# Patient Record
Sex: FEMALE | Race: WHITE
[De-identification: names, ages, dates, MRNs, and addresses within clinical notes are randomized per-mention and may not be internally consistent; named-entity substitution may affect disease eponyms.]

---

## 2020-07-07 ENCOUNTER — HOSPITAL ENCOUNTER (INPATIENT)
Dept: HOSPITAL 75 - LDRP | Age: 25
LOS: 2 days | Discharge: HOME | End: 2020-07-09
Attending: FAMILY MEDICINE | Admitting: FAMILY MEDICINE
Payer: MEDICAID

## 2020-07-07 VITALS — DIASTOLIC BLOOD PRESSURE: 81 MMHG | SYSTOLIC BLOOD PRESSURE: 133 MMHG

## 2020-07-07 VITALS — DIASTOLIC BLOOD PRESSURE: 90 MMHG | SYSTOLIC BLOOD PRESSURE: 152 MMHG

## 2020-07-07 VITALS — DIASTOLIC BLOOD PRESSURE: 95 MMHG | SYSTOLIC BLOOD PRESSURE: 174 MMHG

## 2020-07-07 VITALS — DIASTOLIC BLOOD PRESSURE: 90 MMHG | SYSTOLIC BLOOD PRESSURE: 146 MMHG

## 2020-07-07 VITALS — BODY MASS INDEX: 29.52 KG/M2 | HEIGHT: 60.63 IN | WEIGHT: 154.32 LBS

## 2020-07-07 VITALS — SYSTOLIC BLOOD PRESSURE: 134 MMHG | DIASTOLIC BLOOD PRESSURE: 85 MMHG

## 2020-07-07 VITALS — SYSTOLIC BLOOD PRESSURE: 141 MMHG | DIASTOLIC BLOOD PRESSURE: 88 MMHG

## 2020-07-07 VITALS — SYSTOLIC BLOOD PRESSURE: 125 MMHG | DIASTOLIC BLOOD PRESSURE: 81 MMHG

## 2020-07-07 VITALS — SYSTOLIC BLOOD PRESSURE: 163 MMHG | DIASTOLIC BLOOD PRESSURE: 92 MMHG

## 2020-07-07 VITALS — SYSTOLIC BLOOD PRESSURE: 166 MMHG | DIASTOLIC BLOOD PRESSURE: 100 MMHG

## 2020-07-07 DIAGNOSIS — Z3A.39: ICD-10-CM

## 2020-07-07 LAB
ALBUMIN SERPL-MCNC: 3.4 GM/DL (ref 3.2–4.5)
ALP SERPL-CCNC: 167 U/L (ref 40–136)
ALT SERPL-CCNC: < 6 U/L (ref 0–55)
BASOPHILS # BLD AUTO: 0 10^3/UL (ref 0–0.1)
BASOPHILS NFR BLD AUTO: 0 % (ref 0–10)
BILIRUB SERPL-MCNC: 0.2 MG/DL (ref 0.1–1)
BUN/CREAT SERPL: 18
CALCIUM SERPL-MCNC: 9.3 MG/DL (ref 8.5–10.1)
CHLORIDE SERPL-SCNC: 108 MMOL/L (ref 98–107)
CO2 SERPL-SCNC: 17 MMOL/L (ref 21–32)
CREAT SERPL-MCNC: 0.67 MG/DL (ref 0.6–1.3)
EOSINOPHIL # BLD AUTO: 0 10^3/UL (ref 0–0.3)
EOSINOPHIL NFR BLD AUTO: 1 % (ref 0–10)
ERYTHROCYTE [DISTWIDTH] IN BLOOD BY AUTOMATED COUNT: 15 % (ref 10–14.5)
GFR SERPLBLD BASED ON 1.73 SQ M-ARVRAT: > 60 ML/MIN
GLUCOSE SERPL-MCNC: 73 MG/DL (ref 70–105)
HCT VFR BLD CALC: 36 % (ref 35–52)
HGB BLD-MCNC: 11.9 G/DL (ref 11.5–16)
LYMPHOCYTES # BLD AUTO: 1.5 X 10^3 (ref 1–4)
LYMPHOCYTES NFR BLD AUTO: 17 % (ref 12–44)
MANUAL DIFFERENTIAL PERFORMED BLD QL: NO
MCH RBC QN AUTO: 30 PG (ref 25–34)
MCHC RBC AUTO-ENTMCNC: 33 G/DL (ref 32–36)
MCV RBC AUTO: 91 FL (ref 80–99)
MONOCYTES # BLD AUTO: 0.6 X 10^3 (ref 0–1)
MONOCYTES NFR BLD AUTO: 6 % (ref 0–12)
NEUTROPHILS # BLD AUTO: 6.6 X 10^3 (ref 1.8–7.8)
NEUTROPHILS NFR BLD AUTO: 76 % (ref 42–75)
PLATELET # BLD: 137 10^3/UL (ref 130–400)
PMV BLD AUTO: 12.8 FL (ref 7.4–10.4)
POTASSIUM SERPL-SCNC: 3.8 MMOL/L (ref 3.6–5)
PROT SERPL-MCNC: 6.3 GM/DL (ref 6.4–8.2)
SODIUM SERPL-SCNC: 136 MMOL/L (ref 135–145)
URATE SERPL-MCNC: 5.4 MG/DL (ref 2.6–7.2)
WBC # BLD AUTO: 8.7 10^3/UL (ref 4.3–11)

## 2020-07-07 PROCEDURE — 86901 BLOOD TYPING SEROLOGIC RH(D): CPT

## 2020-07-07 PROCEDURE — 80053 COMPREHEN METABOLIC PANEL: CPT

## 2020-07-07 PROCEDURE — 36415 COLL VENOUS BLD VENIPUNCTURE: CPT

## 2020-07-07 PROCEDURE — 83615 LACTATE (LD) (LDH) ENZYME: CPT

## 2020-07-07 PROCEDURE — 85025 COMPLETE CBC W/AUTO DIFF WBC: CPT

## 2020-07-07 PROCEDURE — 86900 BLOOD TYPING SEROLOGIC ABO: CPT

## 2020-07-07 PROCEDURE — 84156 ASSAY OF PROTEIN URINE: CPT

## 2020-07-07 PROCEDURE — 82570 ASSAY OF URINE CREATININE: CPT

## 2020-07-07 PROCEDURE — 86850 RBC ANTIBODY SCREEN: CPT

## 2020-07-07 PROCEDURE — 84550 ASSAY OF BLOOD/URIC ACID: CPT

## 2020-07-07 RX ADMIN — MISOPROSTOL SCH MCG: 100 TABLET ORAL at 21:18

## 2020-07-07 RX ADMIN — SODIUM CHLORIDE, SODIUM LACTATE, POTASSIUM CHLORIDE, CALCIUM CHLORIDE, AND DEXTROSE MONOHYDRATE SCH MLS/HR: 600; 310; 30; 20; 5 INJECTION, SOLUTION INTRAVENOUS at 19:52

## 2020-07-07 NOTE — HISTORY & PHYSICAL-OB
OB - Chief Complaint & HPI


Date/Time


Date of Admission:


Date of Admission:  2020 at 19:10


Date seen by a Provider:  2020


Time Seen by a Provider:  20:00





Chief Complaint/History


OB-Reason for Admission/Chief:  Induction of Labor


Hx :  1


Hx Para:  0


Expected Date of Delivery:  Jul 10, 2020


Gestational Age in Weeks:  39


Gestational Age in Days:  4


Indication for induction:  maternal discomfort


History of Labs


A+, antibody neg, RI. HIV/HepB/RPR NR. GC neg. Chlamdyia positive at initial, 

treated and MICHAEL neg. 1 hour glucola normal. GBS neg.





Allergies and Home Medications


Allergies


Coded Allergies:  


     No Known Drug Allergies (Unverified , 20)





Patient Home Medication List


Home Medication List Reviewed:  No





OB - History


Hx of Present Pregnancy


Ultrasounds:  Normal mid trimester US


Obstetrical Complications:  None


Medical Complications:  None





Obstetrical History


Hx :  1


Hx Para:  0


Hx # Term Pregnancies:  0


Hx #  Pregnancies:  0


Number of Living Children:  0





Patient Past Medical History





PMHx:


Denies





surgHx:


Denies





Social History/Family History


HIV/AIDS:  No


Recent Infectious Disease Expo:  No


Sexually Transmitted Disease:  Yes (chlamydia)


Alcohol Use:  Denies Use


Smoking Cessation:  Never smoker





Immunizations


Tetanus Booster (TDap):  Less than 5yrs (20)


Rubella:  immune


RPR/VDRL:  Negative


GBS Status:  Negative


HBsAG:  Negative





OB - Admission Exam


Physical Exam


HEENT:  NCAT


Extremities:  Normal


Cervical Dilatation:  2cm


Effacement:  0%


Station:  -3


Membranes:  Intact


Fetal Heart Rate:  130's


Accelerations:  Accelerations Present


Decelerations:  No Decelerations


Short Term Variability:  Present


Long Term Variability:  Average (6-25)


Contractions on Admission:  6-10 Minutes Apart


Intensity:  Mild





Murillo Scoring Tool (Modified)


Dilation (cm):  1-2cm (1)


Effacement (%):  0-30%    (0)


Fetal Descent/Station:  -3        (0)


Cervix Consistency:  Soft      (2)


Cervix Position:  Anterior   (2)


Subtract 1 point for:  Nulliparity (-1)


Murillo Score:  4


Labs





Laboratory Tests








Test


 20


20:04 Range/Units


 


 


White Blood Count


 8.7 


 4.3-11.0


10^3/uL


 


Red Blood Count


 3.91 L


 4.35-5.85


10^6/uL


 


Hemoglobin 11.9  11.5-16.0  G/DL


 


Hematocrit 36  35-52  %


 


Mean Corpuscular Volume 91  80-99  FL


 


Mean Corpuscular Hemoglobin 30  25-34  PG


 


Mean Corpuscular Hemoglobin


Concent 33 


 32-36  G/DL





 


Red Cell Distribution Width 15.0 H 10.0-14.5  %


 


Platelet Count


 137 


 130-400


10^3/uL


 


Mean Platelet Volume 12.8 H 7.4-10.4  FL


 


Neutrophils (%) (Auto) 76 H 42-75  %


 


Lymphocytes (%) (Auto) 17  12-44  %


 


Monocytes (%) (Auto) 6  0-12  %


 


Eosinophils (%) (Auto) 1  0-10  %


 


Basophils (%) (Auto) 0  0-10  %


 


Neutrophils # (Auto) 6.6  1.8-7.8  X 10^3


 


Lymphocytes # (Auto) 1.5  1.0-4.0  X 10^3


 


Monocytes # (Auto) 0.6  0.0-1.0  X 10^3


 


Eosinophils # (Auto)


 0.0 


 0.0-0.3


10^3/uL


 


Basophils # (Auto)


 0.0 


 0.0-0.1


10^3/uL











OB - Assessment/Plan/Diagnosis


Assessment


Assessment:  induction of labor


Admission Dx


39 weeks gestation


Induction of labor planned


GBS negative


Blood type A+


Admission Status:  Inpatient Order (span 2 midnights)


Reason for Inpatient Admission:  


Induction, labor, delivery and postpartum course





Plan


Plan:  Induction


Induction Method:  per Misoprostol Protocol











NEAL FRIEDMAN MD              2020 21:29

## 2020-07-07 NOTE — XMS REPORT
Continuity of Care Document

                             Created on: 2020



Dahiana Goldberg

External Reference #: [id number]

: 1995

Sex: Female



Demographics





                          Address                   [address] [place]  [address]

 

                          Home Phone                [phone] x

 

                          Preferred Language        Unknown

 

                          Marital Status            Unknown

 

                          Pentec[employer]alism Affiliation     Unknown

 

                          Race                      Unknown

 

                          Ethnic Group              Unknown





Author





                          Organization              Unknown

 

                          Address                   Unknown

 

                          Phone                     Unavailable



              



Allergies

      



There is no data.                  



Medications

      



There is no data.                  



Problems

      



There is no data.                  



Procedures

      



There is no data.                  



Results

      



                    Test                Result              Range        

 

                                        TSH - 19 12:37         

 

                    TSH                 0.17 mIU/L           NRG        

 

                                        RUBELLA IMMUNE STATUS - 19 12:37  

       

 

                    RUBELLA ANTIBODY (IGG)           1.23 index           NRG   

     

 

                                        CULTURE, URINE - 19 12:37         

 

                    CULTURE, URINE, ROUTINE           SEE NOTE            NRG   

     

 

                                        SUREPATH PAP RFX HPV mRNA E6/E7 -  12:37         

 

                    CLINICAL INFORMATION:                               NRG     

   

 

                    LMP:                                    NRG        

 

                    PREV. PAP:                               NRG        

 

                    PREV. BX:                               NRG        

 

                    SOURCE:             [employer]              NRG        

 

                    STATEMENT OF ADEQUACY:                               NRG    

    

 

                    INTERPRETATION/RESULT:                               NRG    

    

 

                    CYTOTECHNOLOGIST:                               NRG        

 

                    COMMENT                                 NRG        

 

                                        PENTA SCREEN - 02/10/20 12:22         

 

                    Maternal Weight           125 lbs             NRG        

 

                    Est'd Date of Delivery           07/10/2020            NRG  

      

 

                    JENN Determined by           ULTRASOUND            NRG       

 

 

                    Mother's Ethnic Origin                       NRG   

     

 

                    Number of Fetuses           1                   NRG        

 

                    Insulin Depend Diabetic           NO                  NRG   

     

 

                    Repeat Specimen           NO                  NRG        

 

                    Hx Of Neural Tube Defects           NO                  NRG 

       

 

                    Prev Pregnancy Down Synd           NO                  NRG  

      

 

                    Donor Egg           NO                  NRG        

 

                    Donor Age: Egg Retrieval           NOT GIVEN            NRG 

       

 

                    Cigarette smoker           NOT GIVEN            NRG        

 

                    INTERPRETATION:           SEE NOTE            NRG        

 

                    Risk for ONTD           <1:5000             NRG        

 

                    Age Risk Down Syndrome           1:1120              NRG    

    

 

                    NORM Down Syndrome Risk           <1:5000             <1:270 

       

 

                    NORM Trisomy 18 Risk           <1:5000             <1:100    

    

 

                    Calc'd Gestational Age           18.4                NRG    

    

 

                    AFP, Serum           57.5 ng/mL           NRG        

 

                    AFP MoM             1.12                NRG        

 

                    hCG, Serum           21.6 IU/mL           NRG        

 

                    hCG MoM             0.85                NRG        

 

                    Estriol, Free           1.84 ng/mL           NRG        

 

                    Estriol MoM           1.15                NRG        

 

                    Inhibin A, Dimeric           103 pg/mL           NRG        

 

                    Inhibin A MoM           0.56                NRG        

 

                    h-hCG, Serum           14.1 mcg/L           NRG        

 

                    h-hCG MoM           0.70                NRG        

 

                    Date of Birth           1995            NR        

 

                    Collection Date           02/10/2020            NR        

 

                                        SYPHILIS (RPR W/ REFLEX CONFIRMATION) - 

20 12:43         

 

                          RPR (DX) W/REFL TITER AND CONFIRMATORY TESTING        

   NON-REACTIVE           

                                        NON-REACTIVE        

 

                                        CULTURE, GROUP B STREP (VAGINAL) - 06/15

/20 14:53         

 

                    STREPTOCOCCUS, GROUP B CULTURE           SEE NOTE           

 NRG        

 

                                        Complete blood count (CBC) with automate

d white blood cell (WBC) differential - 

20 20:04         

 

                          Blood leukocytes automated count (number/volume)      

     8.7 10*3/uL          

                                        4.3-11.0        

 

                          Blood erythrocytes automated count (number/volume)    

       3.91 10*6/uL       

                                        4.35-5.85        

 

                    Venous blood hemoglobin measurement (mass/volume)           

11.9 g/dL           

11.5-16.0        

 

                    Blood hematocrit (volume fraction)           36 %           

     35-52        

 

                    Automated erythrocyte mean corpuscular volume           91 [

foz_us]           

80-99        

 

                                        Automated erythrocyte mean corpuscular h

emoglobin (mass per erythrocyte)        

                          30 pg                     25-34        

 

                                        Automated erythrocyte mean corpuscular h

emoglobin concentration measurement 

(mass/volume)             33 g/dL                   32-36        

 

                    Automated erythrocyte distribution width ratio           15.

0 %              10.0-

14.5        

 

                    Automated blood platelet count (count/volume)           137 

10*3/uL           

130-400        

 

                          Automated blood platelet mean volume measurement      

     12.8 [foz_us]        

                                        7.4-10.4        

 

                    Automated blood neutrophils/100 leukocytes           76 %   

             42-75       

 

 

                    Automated blood lymphocytes/100 leukocytes           17 %   

             12-44       

 

 

                    Blood monocytes/100 leukocytes           6 %                

 0-12        

 

                    Automated blood eosinophils/100 leukocytes           1 %    

             0-10        

 

                    Automated blood basophils/100 leukocytes           0 %      

           0-10        

 

                    Blood neutrophils automated count (number/volume)           

6.6 10*3            

1.8-7.8        

 

                    Blood lymphocytes automated count (number/volume)           

1.5 10*3            

1.0-4.0        

 

                    Blood monocytes automated count (number/volume)           0.

6 10*3            

0.0-1.0        

 

                    Automated eosinophil count           0.0 10*3/uL           0

.0-0.3        

 

                    Automated blood basophil count (count/volume)           0.0 

10*3/uL           

0.0-0.1        

 

                                        Blood type T Indirect antibody screen pa

pebbles - 20 20:04         

 

                    WRISTBAND NUMBER           [id number]             NRG        

 

                    ABO+Rh group           AP                  NRG        

 

                    Blood group antibody screen           NEGATIVE            NR

G        



                                



Encounters

      



                ACCT No.           Visit Date/Time           Discharge          

 Status         

             Pt. Type           Provider           Facility           Loc./Unit 

          

Complaint        

 

             [id number]           2020 11:00:00                        [facility]    

       

Outpatient           IDALIA HERZOG, NEAL SANTILLAN [facility]                                             

 

             [id number]           06/15/2020 10:00:00                              

       Document

 Registration                                                                   

 

 

             [id number]           2020 11:00:00                              

       Document

 Registration                                                                   

 

 

             [id number]           02/10/2020 10:20:00                              

       Document

 Registration                                                                   

 

 

             [id number]           2019 09:20:00                              

       Document

 Registration                                                                   

 

 

             [id number]           2020 20:22:00                         

            

Document Registration

## 2020-07-07 NOTE — NUR
BERLIN OSORIO presented to unit via  from ED, accompanied by , with c/o INDUCTION 
39 4/7. BERLIN OSORIO weighed, gowned, voided, and to bed.  EFHM and TOCO applied, VS 
taken.  BERLIN OSORIO oriented to bed controls, call light, TV, heat, and A/C controls.

## 2020-07-08 VITALS — SYSTOLIC BLOOD PRESSURE: 159 MMHG | DIASTOLIC BLOOD PRESSURE: 99 MMHG

## 2020-07-08 VITALS — SYSTOLIC BLOOD PRESSURE: 152 MMHG | DIASTOLIC BLOOD PRESSURE: 76 MMHG

## 2020-07-08 VITALS — SYSTOLIC BLOOD PRESSURE: 140 MMHG | DIASTOLIC BLOOD PRESSURE: 83 MMHG

## 2020-07-08 VITALS — SYSTOLIC BLOOD PRESSURE: 141 MMHG | DIASTOLIC BLOOD PRESSURE: 79 MMHG

## 2020-07-08 VITALS — SYSTOLIC BLOOD PRESSURE: 154 MMHG | DIASTOLIC BLOOD PRESSURE: 80 MMHG

## 2020-07-08 VITALS — SYSTOLIC BLOOD PRESSURE: 160 MMHG | DIASTOLIC BLOOD PRESSURE: 81 MMHG

## 2020-07-08 VITALS — DIASTOLIC BLOOD PRESSURE: 79 MMHG | SYSTOLIC BLOOD PRESSURE: 137 MMHG

## 2020-07-08 VITALS — DIASTOLIC BLOOD PRESSURE: 84 MMHG | SYSTOLIC BLOOD PRESSURE: 146 MMHG

## 2020-07-08 VITALS — SYSTOLIC BLOOD PRESSURE: 163 MMHG | DIASTOLIC BLOOD PRESSURE: 93 MMHG

## 2020-07-08 VITALS — SYSTOLIC BLOOD PRESSURE: 148 MMHG | DIASTOLIC BLOOD PRESSURE: 79 MMHG

## 2020-07-08 VITALS — DIASTOLIC BLOOD PRESSURE: 83 MMHG | SYSTOLIC BLOOD PRESSURE: 140 MMHG

## 2020-07-08 VITALS — DIASTOLIC BLOOD PRESSURE: 84 MMHG | SYSTOLIC BLOOD PRESSURE: 145 MMHG

## 2020-07-08 VITALS — DIASTOLIC BLOOD PRESSURE: 97 MMHG | SYSTOLIC BLOOD PRESSURE: 149 MMHG

## 2020-07-08 VITALS — DIASTOLIC BLOOD PRESSURE: 95 MMHG | SYSTOLIC BLOOD PRESSURE: 177 MMHG

## 2020-07-08 VITALS — SYSTOLIC BLOOD PRESSURE: 128 MMHG | DIASTOLIC BLOOD PRESSURE: 79 MMHG

## 2020-07-08 VITALS — SYSTOLIC BLOOD PRESSURE: 160 MMHG | DIASTOLIC BLOOD PRESSURE: 80 MMHG

## 2020-07-08 VITALS — DIASTOLIC BLOOD PRESSURE: 91 MMHG | SYSTOLIC BLOOD PRESSURE: 170 MMHG

## 2020-07-08 VITALS — DIASTOLIC BLOOD PRESSURE: 81 MMHG | SYSTOLIC BLOOD PRESSURE: 144 MMHG

## 2020-07-08 VITALS — SYSTOLIC BLOOD PRESSURE: 158 MMHG | DIASTOLIC BLOOD PRESSURE: 98 MMHG

## 2020-07-08 VITALS — SYSTOLIC BLOOD PRESSURE: 123 MMHG | DIASTOLIC BLOOD PRESSURE: 77 MMHG

## 2020-07-08 VITALS — DIASTOLIC BLOOD PRESSURE: 92 MMHG | SYSTOLIC BLOOD PRESSURE: 138 MMHG

## 2020-07-08 VITALS — DIASTOLIC BLOOD PRESSURE: 90 MMHG | SYSTOLIC BLOOD PRESSURE: 170 MMHG

## 2020-07-08 VITALS — DIASTOLIC BLOOD PRESSURE: 85 MMHG | SYSTOLIC BLOOD PRESSURE: 151 MMHG

## 2020-07-08 VITALS — SYSTOLIC BLOOD PRESSURE: 143 MMHG | DIASTOLIC BLOOD PRESSURE: 92 MMHG

## 2020-07-08 VITALS — DIASTOLIC BLOOD PRESSURE: 80 MMHG | SYSTOLIC BLOOD PRESSURE: 138 MMHG

## 2020-07-08 VITALS — DIASTOLIC BLOOD PRESSURE: 76 MMHG | SYSTOLIC BLOOD PRESSURE: 156 MMHG

## 2020-07-08 VITALS — DIASTOLIC BLOOD PRESSURE: 89 MMHG | SYSTOLIC BLOOD PRESSURE: 138 MMHG

## 2020-07-08 VITALS — DIASTOLIC BLOOD PRESSURE: 101 MMHG | SYSTOLIC BLOOD PRESSURE: 178 MMHG

## 2020-07-08 VITALS — DIASTOLIC BLOOD PRESSURE: 91 MMHG | SYSTOLIC BLOOD PRESSURE: 156 MMHG

## 2020-07-08 VITALS — DIASTOLIC BLOOD PRESSURE: 80 MMHG | SYSTOLIC BLOOD PRESSURE: 166 MMHG

## 2020-07-08 VITALS — SYSTOLIC BLOOD PRESSURE: 124 MMHG | DIASTOLIC BLOOD PRESSURE: 78 MMHG

## 2020-07-08 LAB
CREAT UR-MCNC: 83 MG/DL (ref 30–125)
PROT UR-MCNC: 23 MG/DL (ref 6–12)

## 2020-07-08 PROCEDURE — 0UQGXZZ REPAIR VAGINA, EXTERNAL APPROACH: ICD-10-PCS | Performed by: FAMILY MEDICINE

## 2020-07-08 PROCEDURE — 0UQMXZZ REPAIR VULVA, EXTERNAL APPROACH: ICD-10-PCS | Performed by: FAMILY MEDICINE

## 2020-07-08 PROCEDURE — 3E033VJ INTRODUCTION OF OTHER HORMONE INTO PERIPHERAL VEIN, PERCUTANEOUS APPROACH: ICD-10-PCS | Performed by: FAMILY MEDICINE

## 2020-07-08 RX ADMIN — SODIUM CHLORIDE, SODIUM LACTATE, POTASSIUM CHLORIDE, CALCIUM CHLORIDE, AND DEXTROSE MONOHYDRATE SCH MLS/HR: 600; 310; 30; 20; 5 INJECTION, SOLUTION INTRAVENOUS at 03:55

## 2020-07-08 RX ADMIN — MISOPROSTOL SCH MCG: 100 TABLET ORAL at 08:47

## 2020-07-08 RX ADMIN — DOCUSATE SODIUM SCH MG: 100 CAPSULE ORAL at 21:59

## 2020-07-08 RX ADMIN — ACETAMINOPHEN SCH MG: 500 TABLET ORAL at 13:54

## 2020-07-08 RX ADMIN — ACETAMINOPHEN SCH MG: 500 TABLET ORAL at 21:59

## 2020-07-08 NOTE — NUR
pt transferred to room 309 via wheelchair.  To bed and oriented to room 309, call light and 
surroundings.

## 2020-07-08 NOTE — NUR
Assisted to bathroom, void and pericare shown and done.  pt ambulating well, back to bed.  
denies needs.

## 2020-07-08 NOTE — OB LABOR & DELIVERY RECORD
Vag Delivery Note


Vag Delivery Note


Date of Delivery: 20 





Preoperative Diagnosis: Dahiana Goldberg  is a (25  /Para  1 / 0,

Gestational Age (wks)39with 5 days, hypertension during labor





Postoperative Diagnosis: Same


Surgeon: NEAL FRIEDMAN 





Anesthesia: none





Delivery Type: Spontaneous vaginal delivery





Findings: 


Viable female infant, apgars [], weight 6#4


Lacerations: right periurethral with right vaginal wall extension, left 

periurethral abrasion


Intact placenta with 3 vessel cord. No nuchal cord, body cord or shoulder 

dystocia


Cytotec 800 mcg placed for postpartum hemorrhage prophylaxis





Estimated Blood Loss: 350 ml





Complications: None





Condition: Stable





Description of Procedure:





The patient is a 25 year old female who presented for elective IOL. She was 

admitted and informed consent was obtained. Her labor course was remarkable for 

hypertension, requiring labetalol x 2, labs negative for preeclampsia. She pro

gressed to complete dilatation and began to push. 





She was then set up for delivery. The infant's head was delivered atraumatically

 in the JACKELINE position. The shoulders and remainder of the infant's body were then

 delivered without difficulty. Upon delivery, the infant was vigorous and placed

 on maternal abdomen. After a delay the cord was doubly clamped and cut and the 

infant remained on maternal abdomen. An intact placenta with 3-vessel cord 

delivered via Nisha and there was found to be minimal bleeding.~ Vigorous 

fundal massage was performed and the fundus was found to be firm. IV oxytocin 

was given. Examination of the vagina and perineum revealed a right periurethral 

laceration with small right vaginal side wall extension repaired in simple 

running fashion with 3-0 rapide suture. Following the repair, sponge, instrument

 and needle counts were correct. Mom and baby were both in stable condition in 

the labor suite.





Vitals - Labs


Vital Signs - I&O





Vital Signs








  Date Time  Temp Pulse Resp B/P (MAP) Pulse Ox O2 Delivery O2 Flow Rate FiO2


 


20 06:45  96 18 145/84 (104)    


 


20 06:30  86 18 141/79 (99)    


 


20 06:15  100 18 138/80 (99)    


 


20 06:00  93 18 151/85 (107)    


 


20 05:45  91 18 140/83 (102)    


 


20 05:30  88 18 140/83 (102)    


 


20 05:00 36.9 73 18 163/93 (116)    


 


20 04:30  73 18 152/76 (101)    


 


20 04:00  85 18 138/92 (107)    


 


20 03:30  77 18 177/95 (122)    


 


20 03:00  92 18 149/97 (114)    


 


20 02:30  82 18 159/99 (119)    


 


20 02:00  81 18 170/91 (117)    


 


20 01:30  82 18 143/92 (109)    


 


20 01:00  86 18 138/89 (105)    


 


20 00:30 36.7 73 18 158/98 (118)    


 


20 23:40  72 18 152/90 (110)    


 


20 23:25  71 18 163/92 (115)    


 


20 23:10  69 18 174/95 (121)    


 


20 22:45  79 18 166/100 (122)    


 


20 22:30  67 18 146/90 (108)    


 


20 22:00  75 18 134/85 (101)    


 


20 21:48 37.0 86 18  96 Room Air  


 


20 21:15  74 18 141/88 (105)    


 


20 20:50  83 18 125/81 (96)    











Labs


Laboratory Tests


20 20:04: 


White Blood Count 8.7, Red Blood Count 3.91L, Hemoglobin 11.9, Hematocrit 36, 

Mean Corpuscular Volume 91, Mean Corpuscular Hemoglobin 30, Mean Corpuscular 

Hemoglobin Concent 33, Red Cell Distribution Width 15.0H, Platelet Count 137, 

Mean Platelet Volume 12.8H, Neutrophils (%) (Auto) 76H, Lymphocytes (%) (Auto) 

17, Monocytes (%) (Auto) 6, Eosinophils (%) (Auto) 1, Basophils (%) (Auto) 0, 

Neutrophils # (Auto) 6.6, Lymphocytes # (Auto) 1.5, Monocytes # (Auto) 0.6, 

Eosinophils # (Auto) 0.0, Basophils # (Auto) 0.0


20 23:20: 


Sodium Level 136, Potassium Level 3.8, Chloride Level 108H, Carbon Dioxide Level

 17L, Anion Gap 11, Blood Urea Nitrogen 12, Creatinine 0.67, Estimat Glomerular 

Filtration Rate > 60, BUN/Creatinine Ratio 18, Glucose Level 73, Uric Acid 5.4, 

Calcium Level 9.3, Corrected Calcium 9.8, Total Bilirubin 0.2, Aspartate Amino 

Transf (AST/SGOT) 15, Alanine Aminotransferase (ALT/SGPT) < 6, Alkaline 

Phosphatase 167H, Lactate Dehydrogenase 181, Total Protein 6.3L, Albumin 3.4


20 07:25: 


Urine Protein 23H, Urine Creatinine 83, Urine Protein/Creatinine Ratio 0.28











NEAL FRIEDMAN MD              2020 09:20

## 2020-07-08 NOTE — NUR
Pitocin started at 999ml/hr as ordered by dr cade

0825 spontaneous vaginal delivery of placenta with cord attached.  fundal massage per dr cade.  

0830 local to perineum per dr cade for repair of right periurethral tear.

0845 ffu/0 with moderate rubra noted.

0900 repair completed per dr cade

0905 plan fo care reviewed with pt and s.o.  pericare and pad under pt.  pt assisted to sf 
position

0915 ffu/0 with lt rubra noted

0930 ffu/0 with lt rubra noted

0945 ffu/0 with moderate rubra noted

## 2020-07-09 VITALS — DIASTOLIC BLOOD PRESSURE: 79 MMHG | SYSTOLIC BLOOD PRESSURE: 122 MMHG

## 2020-07-09 VITALS — SYSTOLIC BLOOD PRESSURE: 137 MMHG | DIASTOLIC BLOOD PRESSURE: 82 MMHG

## 2020-07-09 VITALS — DIASTOLIC BLOOD PRESSURE: 90 MMHG | SYSTOLIC BLOOD PRESSURE: 139 MMHG

## 2020-07-09 VITALS — SYSTOLIC BLOOD PRESSURE: 139 MMHG | DIASTOLIC BLOOD PRESSURE: 90 MMHG

## 2020-07-09 VITALS — SYSTOLIC BLOOD PRESSURE: 143 MMHG | DIASTOLIC BLOOD PRESSURE: 90 MMHG

## 2020-07-09 LAB
BASOPHILS # BLD AUTO: 0 10^3/UL (ref 0–0.1)
BASOPHILS NFR BLD AUTO: 0 % (ref 0–10)
EOSINOPHIL # BLD AUTO: 0 10^3/UL (ref 0–0.3)
EOSINOPHIL NFR BLD AUTO: 0 % (ref 0–10)
ERYTHROCYTE [DISTWIDTH] IN BLOOD BY AUTOMATED COUNT: 15.7 % (ref 10–14.5)
HCT VFR BLD CALC: 32 % (ref 35–52)
HGB BLD-MCNC: 10.6 G/DL (ref 11.5–16)
LYMPHOCYTES # BLD AUTO: 2.3 X 10^3 (ref 1–4)
LYMPHOCYTES NFR BLD AUTO: 14 % (ref 12–44)
MANUAL DIFFERENTIAL PERFORMED BLD QL: NO
MCH RBC QN AUTO: 30 PG (ref 25–34)
MCHC RBC AUTO-ENTMCNC: 33 G/DL (ref 32–36)
MCV RBC AUTO: 92 FL (ref 80–99)
MONOCYTES # BLD AUTO: 1 X 10^3 (ref 0–1)
MONOCYTES NFR BLD AUTO: 6 % (ref 0–12)
NEUTROPHILS # BLD AUTO: 12.5 X 10^3 (ref 1.8–7.8)
NEUTROPHILS NFR BLD AUTO: 79 % (ref 42–75)
PLATELET # BLD: 116 10^3/UL (ref 130–400)
PMV BLD AUTO: 12.4 FL (ref 7.4–10.4)
WBC # BLD AUTO: 15.9 10^3/UL (ref 4.3–11)

## 2020-07-09 RX ADMIN — ACETAMINOPHEN SCH MG: 500 TABLET ORAL at 06:14

## 2020-07-09 RX ADMIN — Medication SCH ML: at 07:46

## 2020-07-09 RX ADMIN — Medication SCH ML: at 05:00

## 2020-07-09 RX ADMIN — Medication SCH ML: at 04:57

## 2020-07-09 RX ADMIN — DOCUSATE SODIUM SCH MG: 100 CAPSULE ORAL at 07:54

## 2020-07-09 NOTE — DISCHARGE SUMMARY
Diagnosis/Chief Complaint


Date of Admission


2020 at 19:10


Date of Discharge


2020


Admission Diagnosis


Admission Diagnosis


Active Labor


Third Trimester Pregnancy


39 week gestation





Discharge Diagnosis





HTN during labor: resolved


R Periurethral with vaginal wall extension laceration: Repaired at delivery





Discharge Summary-Simple/Stand


Discharge Physical Examination


Allergies:  


Coded Allergies:  


     No Known Drug Allergies (Unverified , 20)


Vitals & I&Os





Vital Sign - Last 12Hours








  Date Time  Temp Pulse Resp B/P (MAP) Pulse Ox O2 Delivery O2 Flow Rate FiO2


 


20 08:38     98   


 


20 08:37 36.3 86 18 122/79 (93)  Room Air  








General Appearance:  Alert, Oriented X3, Cooperative, No Acute Distress


HEENT:  Mucous Memb Moist/[place]


Respiratory:  Clear to Auscultation, Normal Air Movement


Cardiovascular:  Regular Rate, No Murmurs


Abdominal:  Normal Bowel Sounds, Soft, No Tenderness, Other (Fundus firm and at 

umblicus)


Extremities:  No Edema, No Tenderness/Swelling


Skin:  No Rashes, No Breakdown


Neuro:  Normal Speech, Strength at 5/5 X4 Ext, Sensation Intact, Cranial Nerves 

3-12 NL


Psych/Mental Status:  Mental Status NL





Hospital Course


Was the Problem List Reviewed?:  Yes


See final discharge diagnosis.





Discussion & Recommendations


26 yo G1 now P1 delivered term female infant via  @ 39.5 wga. Labor was 

complicated by HTN that resolved after labor.





Discharge


Condition at discharge


stable


Instructions to patient/family


Please see electronic discharge instructions given to patient.


Discharge Medications


Reviewed and agree with Discharge Medication list on patient's Discharge 

Instruction sheet





Clinical Quality Measures


DVT/VTE Risk/Contraindication:


Risk Factor Score Per Nursin


RFS Level Per Nursing on Admit:  1=Low/No VTE PPX





Copy


Copies To 1:   NEAL FRIEDMAN MD, HOLLY R MD                2020 11:21

## 2020-07-09 NOTE — DISCHARGE SUMMARY
Discharge Inst-[facility]


Reconcile Patient Problems


Problems Reviewed?:  Yes





Depart Medications


New, Converted or Re-Newed RX:  Other


Continued Medications:  


Ferrous Sulfate (Ferrous Sulfate) 325 Mg Tablet


325 MG PO, TAB





Prenatal Vits #93/Iron Fum/FA (Prenatal Formula Tablet) 1 Each Tablet


1 EACH PO, TAB











Follow Up/Instructions


Goal/Follow Up:  


F/u with Mark in 6 weeks





Activity


Activity:  Activity as Tolerated


Driving Instructions:  You May Drive


NO SMOKING:  NO SMOKING


Nothing Inside Vagina:  No Douching, No [place], No Tampons





Diet


Discharge Diet:  No Restrictions


Symptoms to Report to :  Bleeding Excessive, Fever Over 101 Degrees F, 

Shortness of Breath


For Any Problems or Questions:  Contact Your Physician


Copies To 1:   NEAL FRIEDMAN MD, HOLLY R MD                Jul 9, 2020 11:23